# Patient Record
Sex: MALE | Race: WHITE | URBAN - NONMETROPOLITAN AREA
[De-identification: names, ages, dates, MRNs, and addresses within clinical notes are randomized per-mention and may not be internally consistent; named-entity substitution may affect disease eponyms.]

---

## 2018-01-20 ENCOUNTER — OFFICE VISIT (OUTPATIENT)
Dept: PRIMARY CARE CLINIC | Age: 23
End: 2018-01-20
Payer: COMMERCIAL

## 2018-01-20 VITALS
RESPIRATION RATE: 20 BRPM | SYSTOLIC BLOOD PRESSURE: 120 MMHG | HEIGHT: 69 IN | HEART RATE: 98 BPM | OXYGEN SATURATION: 96 % | TEMPERATURE: 98.5 F | WEIGHT: 188 LBS | DIASTOLIC BLOOD PRESSURE: 80 MMHG | BODY MASS INDEX: 27.85 KG/M2

## 2018-01-20 DIAGNOSIS — J40 BRONCHITIS: Primary | ICD-10-CM

## 2018-01-20 PROCEDURE — 99213 OFFICE O/P EST LOW 20 MIN: CPT | Performed by: NURSE PRACTITIONER

## 2018-01-20 RX ORDER — AZITHROMYCIN 250 MG/1
TABLET, FILM COATED ORAL
Qty: 6 TABLET | Refills: 0 | Status: SHIPPED | OUTPATIENT
Start: 2018-01-20 | End: 2019-12-07

## 2018-01-20 ASSESSMENT — ENCOUNTER SYMPTOMS
TROUBLE SWALLOWING: 0
SHORTNESS OF BREATH: 0
SINUS PAIN: 0
COUGH: 1
SINUS PRESSURE: 0
WHEEZING: 0
SORE THROAT: 0
STRIDOR: 0

## 2018-01-20 NOTE — PATIENT INSTRUCTIONS
label.  · Breathe moist air from a humidifier, hot shower, or sink filled with hot water. The heat and moisture will thin mucus so you can cough it out. · Do not smoke. Smoking can make bronchitis worse. If you need help quitting, talk to your doctor about stop-smoking programs and medicines. These can increase your chances of quitting for good. When should you call for help? Call 911 anytime you think you may need emergency care. For example, call if:  ? · You have severe trouble breathing. ?Call your doctor now or seek immediate medical care if:  ? · You have new or worse trouble breathing. ? · You cough up dark brown or bloody mucus (sputum). ? · You have a new or higher fever. ? · You have a new rash. ? Watch closely for changes in your health, and be sure to contact your doctor if:  ? · You cough more deeply or more often, especially if you notice more mucus or a change in the color of your mucus. ? · You are not getting better as expected. Where can you learn more? Go to [email or web address] and sign in to your [facility] account. Enter H333 in the [employer] box to learn more about \"Bronchitis: Care Instructions. \"     If you do not have an account, please click on the \"Sign Up Now\" link. Current as of: May 12, 2017  Content Version: 11.5  © 0140-5672 [employer]. Care instructions adapted under license by [facility] ([facility]). If you have questions about a medical condition or this instruction, always ask your healthcare professional. [place] [address] any warranty or liability for your use of this information. Patient Education          azithromycin  Pronunciation:  a ESTELLA sahu MYE sin  Brand:  Azithromycin 3 Day Dose Pack, Azithromycin 5 Day Dose Pack, Zithromax, Zithromax TRI-LOBITO, Zithromax Z-Lobito, Zmax  What is the most important information I should know about azithromycin?   You should not use this medication if you have ever had more water to the same glass, swirl gently and drink right away. Throw away any mixed Zmax oral suspension that has not been used within 12 hours. Shake the oral suspension (liquid) well just before you measure a dose. Measure liquid medicine with the dosing syringe provided, or with a special dose-measuring spoon or medicine cup. If you do not have a dose-measuring device, ask your pharmacist for one. Use this medicine for the full prescribed length of time. Your symptoms may improve before the infection is completely cleared. Skipping doses may also increase your risk of further infection that is resistant to antibiotics. Azithromycin will not treat a viral infection such as the flu or a common cold. Store at room temperature away from moisture and heat. Throw away any unused liquid medicine after 10 days. What happens if I miss a dose? Take the missed dose as soon as you remember. Skip the missed dose if it is almost time for your next scheduled dose. Do not  take extra medicine to make up the missed dose. What happens if I overdose? Seek emergency medical attention or call the Poison Help line at 1-930.841.4898. What should I avoid while taking azithromycin? Do not take antacids that contain aluminum or magnesium within 2 hours before or after you take azithromycin. This includes Acid Gone, Aldroxicon, Alternagel, Di-Gel, Gaviscon, Gelusil, Genaton, Maalox, Maldroxal, Milk of Magnesia, Mintox, Mylagen, Mylanta, Pepcid Complete, Rolaids, Rulox, and others. These antacids can make azithromycin less effective when taken at the same time. Antibiotic medicines can cause diarrhea, which may be a sign of a new infection. If you have diarrhea that is watery or bloody, stop taking azithromycin and call your doctor. Do not use anti-diarrhea medicine unless your doctor tells you to. Avoid exposure to sunlight or tanning beds. Azithromycin can make you sunburn more easily.  Wear protective clothing and use

## 2019-12-07 ENCOUNTER — HOSPITAL ENCOUNTER (OUTPATIENT)
Age: 24
Setting detail: SPECIMEN
Discharge: HOME | End: 2019-12-07
Payer: COMMERCIAL

## 2019-12-07 ENCOUNTER — OFFICE VISIT (OUTPATIENT)
Dept: PRIMARY CARE CLINIC | Age: 24
End: 2019-12-07
Payer: COMMERCIAL

## 2019-12-07 VITALS
OXYGEN SATURATION: 99 % | DIASTOLIC BLOOD PRESSURE: 80 MMHG | TEMPERATURE: 98 F | SYSTOLIC BLOOD PRESSURE: 124 MMHG | BODY MASS INDEX: 29.97 KG/M2 | WEIGHT: 200 LBS | HEART RATE: 68 BPM

## 2019-12-07 DIAGNOSIS — K12.0 CANKER SORE: ICD-10-CM

## 2019-12-07 DIAGNOSIS — J02.9 PHARYNGITIS, UNSPECIFIED ETIOLOGY: ICD-10-CM

## 2019-12-07 DIAGNOSIS — J02.9 PHARYNGITIS, UNSPECIFIED ETIOLOGY: Primary | ICD-10-CM

## 2019-12-07 LAB — S PYO AG THROAT QL: NORMAL

## 2019-12-07 PROCEDURE — 99213 OFFICE O/P EST LOW 20 MIN: CPT | Performed by: NURSE PRACTITIONER

## 2019-12-07 PROCEDURE — 87651 STREP A DNA AMP PROBE: CPT

## 2019-12-07 PROCEDURE — 87880 STREP A ASSAY W/OPTIC: CPT | Performed by: NURSE PRACTITIONER

## 2019-12-07 ASSESSMENT — ENCOUNTER SYMPTOMS
GASTROINTESTINAL NEGATIVE: 1
TROUBLE SWALLOWING: 0
SORE THROAT: 1
RESPIRATORY NEGATIVE: 1
EYES NEGATIVE: 1

## 2019-12-08 LAB
DIRECT EXAM: NORMAL
Lab: NORMAL
SPECIMEN DESCRIPTION: NORMAL

## 2022-02-28 ENCOUNTER — OFFICE VISIT (OUTPATIENT)
Dept: PRIMARY CARE CLINIC | Age: 27
End: 2022-02-28
Payer: MEDICAID

## 2022-02-28 VITALS
TEMPERATURE: 98 F | SYSTOLIC BLOOD PRESSURE: 140 MMHG | BODY MASS INDEX: 30.75 KG/M2 | DIASTOLIC BLOOD PRESSURE: 89 MMHG | HEIGHT: 69 IN | OXYGEN SATURATION: 99 % | RESPIRATION RATE: 18 BRPM | HEART RATE: 70 BPM | WEIGHT: 207.6 LBS

## 2022-02-28 DIAGNOSIS — L01.00 IMPETIGO: Primary | ICD-10-CM

## 2022-02-28 PROCEDURE — 1036F TOBACCO NON-USER: CPT | Performed by: NURSE PRACTITIONER

## 2022-02-28 PROCEDURE — G8427 DOCREV CUR MEDS BY ELIG CLIN: HCPCS | Performed by: NURSE PRACTITIONER

## 2022-02-28 PROCEDURE — G8417 CALC BMI ABV UP PARAM F/U: HCPCS | Performed by: NURSE PRACTITIONER

## 2022-02-28 PROCEDURE — G8484 FLU IMMUNIZE NO ADMIN: HCPCS | Performed by: NURSE PRACTITIONER

## 2022-02-28 PROCEDURE — 99213 OFFICE O/P EST LOW 20 MIN: CPT | Performed by: NURSE PRACTITIONER

## 2022-02-28 RX ORDER — ACYCLOVIR 400 MG/1
400 TABLET ORAL 3 TIMES DAILY
Qty: 21 TABLET | Refills: 0 | Status: CANCELLED | OUTPATIENT
Start: 2022-02-28 | End: 2022-03-07

## 2022-02-28 RX ORDER — CEPHALEXIN 500 MG/1
500 CAPSULE ORAL 4 TIMES DAILY
Qty: 28 CAPSULE | Refills: 0 | Status: SHIPPED | OUTPATIENT
Start: 2022-02-28 | End: 2022-03-07

## 2022-02-28 RX ORDER — MUPIROCIN CALCIUM 20 MG/G
CREAM TOPICAL
Qty: 30 G | Refills: 0 | Status: SHIPPED | OUTPATIENT
Start: 2022-02-28

## 2022-02-28 SDOH — ECONOMIC STABILITY: FOOD INSECURITY: WITHIN THE PAST 12 MONTHS, THE FOOD YOU BOUGHT JUST DIDN'T LAST AND YOU DIDN'T HAVE MONEY TO GET MORE.: NEVER TRUE

## 2022-02-28 SDOH — ECONOMIC STABILITY: FOOD INSECURITY: WITHIN THE PAST 12 MONTHS, YOU WORRIED THAT YOUR FOOD WOULD RUN OUT BEFORE YOU GOT MONEY TO BUY MORE.: NEVER TRUE

## 2022-02-28 ASSESSMENT — PATIENT HEALTH QUESTIONNAIRE - PHQ9
SUM OF ALL RESPONSES TO PHQ QUESTIONS 1-9: 0
SUM OF ALL RESPONSES TO PHQ QUESTIONS 1-9: 0
1. LITTLE INTEREST OR PLEASURE IN DOING THINGS: 0
2. FEELING DOWN, DEPRESSED OR HOPELESS: 0
SUM OF ALL RESPONSES TO PHQ QUESTIONS 1-9: 0
SUM OF ALL RESPONSES TO PHQ9 QUESTIONS 1 & 2: 0
DEPRESSION UNABLE TO ASSESS: PT REFUSES
SUM OF ALL RESPONSES TO PHQ QUESTIONS 1-9: 0

## 2022-02-28 ASSESSMENT — SOCIAL DETERMINANTS OF HEALTH (SDOH): HOW HARD IS IT FOR YOU TO PAY FOR THE VERY BASICS LIKE FOOD, HOUSING, MEDICAL CARE, AND HEATING?: NOT HARD AT ALL

## 2022-02-28 NOTE — PATIENT INSTRUCTIONS
SURVEY:    You may be receiving a survey from [facility] regarding your visit today. Please complete the survey to enable us to provide the highest quality of care to you and your family. If you cannot score us a very good on any question, please call the office to discuss how we could of made your experience a very good one. Thank you for letting us take care of you today. We hope all your questions were addressed. If a question was overlooked or something else comes to mind after you return home, please contact a member of your Care Team listed below. Your Care Team at [address] [place] [address]  Yosi Soni, [address] [place] [address]             Walk-in contact numbers:             Phone: [phone]                 Fax: [phone]           [facility] Walk-in Hours:  Mon-Thurs: 9:00 am - 5:30 pm     Friday: 8:00 am - 12:00 pm           Sat-Sun: CLOSED  Patient Education      Patient Education      Patient Education        Impetigo: Care Instructions  Your Care Instructions  Impetigo (say \"ip-gcf-ZY-go\") is a skin infection caused by bacteria. It causes blisters that break and become oozing, yellow, crusty sores. Impetigo can be anywhere on the body. Scratching the sores may spread the infection to other parts of the body. You can also spread it to others through close contact or when you share towels, clothing, and other items. Prescription antibiotic ointment or pills can usually cure impetigo. (After a day of antibiotics, the infection should not spread.)  Follow-up care is a key part of your treatment and safety. Be sure to make and go to all appointments, and call your doctor if you are having problems. It's also a good idea to know your test results and keep a list of the medicines you take. How can you care for yourself at home? · Apply antibiotic ointment exactly as instructed.   · If your doctor prescribed antibiotic pills, take them as directed. Do not stop using them just because you feel better. You need to take the full course of antibiotics. · Gently wash the sores with soap and water each day. If crusts form, your doctor may advise you to soften or remove the crusts. You can do this by soaking them in warm water and patting them dry. This can help the cream or ointment treat impetigo. · After you touch the area, wash your hands with soap and water. Or you can use an alcohol-based hand . · Don't share items such as towels, sheets, and clothing until the infection is gone. · Wash anything that may have touched the infected area. · Try to avoid scratching the area. When should you call for help? Call your doctor now or seek immediate medical care if:    · You have symptoms of a worse infection, such as:  ? Increased pain, swelling, warmth, or redness. ? Red streaks leading from the area. ? Pus draining from the area. ? A fever.     · Impetigo gets worse or spreads to other areas. Watch closely for changes in your health, and be sure to contact your doctor if:    · You do not get better as expected. Where can you learn more? Go to [email or web address]://[employer]. org and sign in to your [employer] account. Enter J096 in the [facility] box to learn more about \"Impetigo: Care Instructions. \"     If you do not have an account, please click on the \"Sign Up Now\" link. Current as of: September 20, 2021               Content Version: 13.1  © 2006-2021 Healthwise, Incorporated. Care instructions adapted under license by [facility] ([facility]). If you have questions about a medical condition or this instruction, always ask your healthcare professional. [place] [address] any warranty or liability for your use of this information. cephalexin  Pronunciation:  magalie a GRACE in  Brand:  Keflex  What is the most important information I should know about cephalexin?   You should not use this medicine if you are allergic to cephalexin or to similar antibiotics, such as Ceftin, Cefzil, Omnicef, and others. Tell your doctor if you are allergic to any drugs, especially penicillins or other antibiotics. What is cephalexin? Cephalexin is a cephalosporin (SEF a low spor in) antibiotic that is used to treat bacterial infections of the lungs, ear, skin, bones, bladder, and kidneys. Cephalexin is used to treat infections in adults and children who are at least 3year old. Cephalexin may also be used for purposes not listed in this medication guide. What should I discuss with my healthcare provider before taking cephalexin? You should not use this medicine if you are allergic to cephalexin or any other cephalosporin antibiotic (cefdinir, cefadroxil, cefoxitin, cefprozil, ceftriaxone, cefuroxime, Omnicef, and others). Tell your doctor if you have ever had:  · an allergy to any drug (especially penicillin);  · liver or kidney disease; or  · intestinal problems, such as colitis. The liquid form of cephalexin may contain sugar. This may affect you if you have diabetes. Tell your doctor if you are pregnant or breast-feeding. How should I take cephalexin? Follow all directions on your prescription label and read all medication guides or instruction sheets. Use the medicine exactly as directed. Do not use cephalexin to treat any condition that has not been checked by your doctor. Measure liquid medicine carefully. Use the dosing syringe provided, or use a medicine dose-measuring device (not a kitchen spoon). Use this medicine for the full prescribed length of time, even if your symptoms quickly improve. Skipping doses can increase your risk of infection that is resistant to medication. Cephalexin will not treat a viral infection such as the flu or a common cold. Do not share cephalexin with another person, even if they have the same symptoms you have. This medicine can affect the results of certain medical tests. Tell any doctor who treats you that you are using cephalexin. Store the tablets and capsules at room temperature away from moisture, heat, and light. Store the liquid medicine in the refrigerator. Throw away any unused liquid after 14 days. What happens if I miss a dose? Take the medicine as soon as you can, but skip the missed dose if it is almost time for your next dose. Do not take two doses at one time. What happens if I overdose? Seek emergency medical attention or call the Poison Help line at 1-122.770.3048. Overdose symptoms may include nausea, vomiting, stomach pain, diarrhea, and blood in your urine. What should I avoid while taking cephalexin? Antibiotic medicines can cause diarrhea, which may be a sign of a new infection. If you have diarrhea that is watery or bloody, call your doctor before using anti-diarrhea medicine. What are the possible side effects of cephalexin? Get emergency medical help if you have signs of an allergic reaction (hives, difficult breathing, swelling in your face or throat) or a severe skin reaction (fever, sore throat, burning eyes, skin pain, red or purple skin rash with blistering and peeling). Call your doctor at once if you have:  · severe stomach pain, diarrhea that is watery or bloody (even if it occurs months after your last dose);  · unusual tiredness, feeling light-headed or short of breath;  · easy bruising, unusual bleeding, purple or red spots under your skin;  · a seizure;  · pale skin, cold hands and feet;  · yellowed skin, dark colored urine;  · fever, weakness; or  · pain in your side or lower back, painful urination. Common side effects may include:  · diarrhea;  · nausea, vomiting;  · indigestion, stomach pain; or  · vaginal itching or discharge. This is not a complete list of side effects and others may occur. Call your doctor for medical advice about side effects. You may report side effects to FDA at 9-437-UKJ-3642.   What other drugs will affect cephalexin? Tell your doctor about all your other medicines, especially:  · metformin; or  · probenecid. This list is not complete. Other drugs may affect cephalexin, including prescription and over-the-counter medicines, vitamins, and herbal products. Not all possible drug interactions are listed here. Where can I get more information? Your pharmacist can provide more information about cephalexin. Remember, keep this and all other medicines out of the reach of children, never share your medicines with others, and use this medication only for the indication prescribed. Every effort has been made to ensure that the information provided by [facility] is accurate, up-to-date, and complete, but no guarantee is made to that effect. Drug information contained herein may be time sensitive. [facility] information has been compiled for use by healthcare practitioners and consumers in the [place] and therefore [employer] does not warrant that uses outside of the [place] are appropriate, unless specifically indicated otherwise. [facility]'s drug information does not endorse drugs, diagnose patients or recommend therapy. [facility]'s drug information is an informational resource designed to assist licensed healthcare practitioners in caring for their patients and/or to serve consumers viewing this service as a supplement to, and not a substitute for, the expertise, skill, knowledge and judgment of healthcare practitioners. The absence of a warning for a given drug or drug combination in no way should be construed to indicate that the drug or drug combination is safe, effective or appropriate for any given patient. [employer] does not assume any responsibility for any aspect of healthcare administered with the aid of information [facility] provides.  The information contained herein is not intended to cover all possible uses, directions, precautions, warnings, drug interactions, allergic reactions, or adverse effects. If you have questions about the drugs you are taking, check with your doctor, nurse or pharmacist.  Copyright 1940-0114 [address]: 10.03. Revision date: 1/4/2021. Care instructions adapted under license by [facility] ([facility]). If you have questions about a medical condition or this instruction, always ask your healthcare professional. [place] [address] any warranty or liability for your use of this information. mupirocin topical  Pronunciation:  myoo PEER oh sin  Brand:  Bactroban, Centany, Centany AT Kit  What is the most important information I should know about mupirocin topical?  Follow all directions on your medicine label and package. Tell each of your healthcare providers about all your medical conditions, allergies, and all medicines you use. What is mupirocin topical?  Mupirocin is an antibiotic that prevents bacteria from growing on your skin. Mupirocin topical (for use on the skin) is used to treat skin infections such as impetigo (NV-tw-KST-go) or a \"Staph\" infection of the skin. Mupirocin topical may also be used for purposes not listed in this medication guide. What should I discuss with my healthcare provider before using mupirocin topical?  You should not use this medicine if you are allergic to mupirocin. To make sure mupirocin topical is safe for you, tell your doctor if you have ever had:  · kidney disease. Do not use mupirocin topical on a child without medical advice. The cream should not be used on a child younger than 1 months old. The ointment may be used on a child as young as 3 months old. It is not known whether this medicine will harm an unborn baby. Tell your doctor if you are pregnant. It is not known whether mupirocin topical passes into breast milk or if it could harm a nursing baby. Tell your doctor if you are breast-feeding a baby.  If you apply this medicine to your breast or nipple, wash the areas thoroughly before nursing your anti-diarrhea medicine unless your doctor tells you to. Avoid getting this medicine in your eyes, mouth, or nose. A separate product called mupirocin nasal is made for use in the nose. Mupirocin topical is for use only on the skin. Avoid using other medications on the areas you treat with mupirocin topical unless your doctor tells you to. What are the possible side effects of mupirocin topical?  Get emergency medical help if you have signs of an allergic reaction: hives; dizziness, fast or pounding heartbeats; wheezing, difficult breathing; swelling of your face, lips, tongue, or throat. Stop using this medicine and call your doctor at once if you have:  · severe stomach pain, diarrhea that is watery or bloody;  · severe itching, rash, or other irritation of treated skin;  · unusual skin blistering or peeling; or  · any signs of a new skin infection. Common side effects may include:  · burning, stinging;  · itching; or  · pain. This is not a complete list of side effects and others may occur. Call your doctor for medical advice about side effects. You may report side effects to FDA at 4-679-FDA-9400. What other drugs will affect mupirocin topical?  It is not likely that other drugs you take orally or inject will have an effect on topically applied mupirocin. But many drugs can interact with each other. Tell each of your health care providers about all medicines you use, including prescription and over-the-counter medicines, vitamins, and herbal products. Where can I get more information? Your pharmacist can provide more information about mupirocin topical.  Remember, keep this and all other medicines out of the reach of children, never share your medicines with others, and use this medication only for the indication prescribed. Every effort has been made to ensure that the information provided by [address] is accurate, up-to-date, and complete, but no guarantee is made to that effect.

## 2022-02-28 NOTE — PROGRESS NOTES
Chief Complaint:   Oral Swelling (Sore on lip that has started swellings since yesterday, pain down into jaw and neck.)      History of Present Illness   Source of history provided by:  patient. Darline Das is a 27 y.o. old male who has a past medical history of: No past medical history on file. presents to the walk in clinic for evaluation of draining, crusty sore to the left lower corner of lip and swelling x 1 days. According to Beata, symptoms initially began with a \"a crack in the corner of my mouth or a sore that I began picking at with my fingers. I noticed last evening the area was becoming more painful, draining and I woke up with swelling. \"  Question denies any prodrome symptoms including tingling, pain, or history of herpes labialis. Denies any fever, chills, dyspnea, dysphagia, CP, SOB, cough, nausea, vomiting, rash, or lethargy. ROS   Unless otherwise stated in this report or unable to obtain because of the patient's clinical or mental status as evidenced by the medical record, this patients's positive and negative responses for Review of Systems, constitutional, psych, eyes, ENT, cardiovascular, respiratory, gastrointestinal, neurological, genitourinary, musculoskeletal, integument systems and systems related to the presenting problem are either stated in the preceding or were not pertinent or were negative for the symptoms and/or complaints related to the medical problem. Past Medical History:  has no past medical history on file. Past Surgical History:  has no past surgical history on file. Social History:  reports that he quit smoking about 8 years ago. He has never used smokeless tobacco. He reports current alcohol use. He reports that he does not use drugs. Family History: family history is not on file.    Allergies: Asa [aspirin]    Physical Exam    VS:  BP (!) 140/89 (Site: Right Upper Arm, Position: Sitting, Cuff Size: Medium Adult)   Pulse 70   Temp 98 °F (36.7 °C) (Oral)   Resp 18   Ht 5' 9\" (1.753 m)   Wt 207 lb 9.6 oz (94.2 kg)   SpO2 99%   BMI 30.66 kg/m²        Constitutional:  Alert, development consistent with age. HEENT:  NC/NT. Airway patent. Eyes PERRL. Ears with normal bilateral TM's and normal bilateral canals. Neck:  Supple. Normal ROM. No adenopathy. Lips:  Minimal erythema with a honey crusted 4-5 mm lesion to the left lower corner of his lips. Left lateral side of lower lip with moderate amounts of swelling, no fluctuance of abscess. No vesicles, grouped lesions or other open areas noted. Mouth:  Normal tongue and moist buccal mucosa. Floor of the mouth is soft. No tenderness in the submental or submandibular space. No tongue elevation. Dental:  No tooth tenderness or obvious decay noted. No trismus present. No drooling. Good dentition noted. Lungs:  Clear to auscultation and breath sounds equal.    CV: Regular rate and rhythm, normal heart sounds, without pathological murmurs, ectopy, gallops, or rubs. Skin:  No rashes, erythema present as documented above. Lymphatics: No lymphangitis or adenopathy noted other then stated above. Neurological:  Alert and orientated. Motor functions intact. Lab / Imaging Results   (All laboratory and radiology results have been personally reviewed by myself)  Labs:  No results found for this visit on 02/28/22. Imaging: All Radiology results interpreted by Radiologist unless otherwise noted. No orders to display       Medical Decision Making   Pt non-toxic, in no apparent distress and stable at time of discharge. Assessment / Plan   Impression(s):  Marina Landeros was seen today for oral swelling. Diagnoses and all orders for this visit:    Impetigo  -     cephALEXin (KEFLEX) 500 MG capsule; Take 1 capsule by mouth 4 times daily for 7 days  -     mupirocin (BACTROBAN) 2 % cream; Apply topically 3 times daily x7 days. - Differentials include herpes labialis versus impetigo. Treating today for concern for impetigo with cellulitis with cephalexin and Bactroban, administration and side effects discussed of both. Encouraged to take with food and a probiotic.  -Discussed symptomatic care including use of warm moist washcloth to help remove crusting and to keep the area clean and dry. Encouraged no further picking at the area. -Return to clinic if no improvements. ED for worsening or concerns. SHANNAN Vicente - CNP    This visit was provided as a focused evaluation during the Margi [place] -19 pandemic/national emergency. A comprehensive review of all previous patient history and testing was not conducted. Pertinent findings were elicited during the visit.

## 2022-11-18 ENCOUNTER — OFFICE VISIT (OUTPATIENT)
Dept: PRIMARY CARE CLINIC | Age: 27
End: 2022-11-18
Payer: MEDICAID

## 2022-11-18 VITALS
TEMPERATURE: 98.5 F | BODY MASS INDEX: 30.66 KG/M2 | HEIGHT: 69 IN | DIASTOLIC BLOOD PRESSURE: 84 MMHG | SYSTOLIC BLOOD PRESSURE: 135 MMHG | RESPIRATION RATE: 18 BRPM | OXYGEN SATURATION: 100 % | HEART RATE: 65 BPM | WEIGHT: 207 LBS

## 2022-11-18 DIAGNOSIS — L02.412 ABSCESS OF LEFT AXILLA: Primary | ICD-10-CM

## 2022-11-18 PROCEDURE — 1036F TOBACCO NON-USER: CPT | Performed by: NURSE PRACTITIONER

## 2022-11-18 PROCEDURE — G8417 CALC BMI ABV UP PARAM F/U: HCPCS | Performed by: NURSE PRACTITIONER

## 2022-11-18 PROCEDURE — G8427 DOCREV CUR MEDS BY ELIG CLIN: HCPCS | Performed by: NURSE PRACTITIONER

## 2022-11-18 PROCEDURE — G8484 FLU IMMUNIZE NO ADMIN: HCPCS | Performed by: NURSE PRACTITIONER

## 2022-11-18 PROCEDURE — 99213 OFFICE O/P EST LOW 20 MIN: CPT | Performed by: NURSE PRACTITIONER

## 2022-11-18 RX ORDER — DOXYCYCLINE 100 MG/1
100 CAPSULE ORAL 2 TIMES DAILY
Qty: 14 CAPSULE | Refills: 0 | Status: SHIPPED | OUTPATIENT
Start: 2022-11-18 | End: 2022-11-25

## 2022-11-18 ASSESSMENT — ENCOUNTER SYMPTOMS: COLOR CHANGE: 1

## 2022-11-18 NOTE — PROGRESS NOTES
Chief Complaint:   Mass (Started about 1-2 days ago-Under left arm in armpit area, painful and red.)      History of Present Illness   Source of history provided by:  patientTheron Whitten is a 24 y.o. old male who has a past medical history of: No past medical history on file. Presents to the walk in clinic for evaluation of redness to the left axilla, which began 2 days ago. Reports the area is warm to touch, moderately painful, and swollen. No lymphangitic streaking. Denies any bleeding or active drainage. Since onset the symptoms have progressed. Denies any fever, chills, HA, recent illness, myalgias, nausea, vomiting, or lethargy. ROS   Review of Systems   Constitutional:  Negative for chills and fever. Musculoskeletal:  Negative for neck pain. Skin:  Positive for color change. Negative for wound. Past Surgical History:  has no past surgical history on file. Social History:  reports that he quit smoking about 8 years ago. He has never used smokeless tobacco. He reports current alcohol use. He reports that he does not use drugs. Family History: family history is not on file. Allergies: Asa [aspirin]    Physical Exam    VS:  /84 (Site: Left Upper Arm, Position: Sitting, Cuff Size: Medium Adult)   Pulse 65   Temp 98.5 °F (36.9 °C) (Oral)   Resp 18   Ht 5' 9\" (1.753 m)   Wt 207 lb (93.9 kg)   SpO2 100%   BMI 30.57 kg/m²      Constitutional:  Alert, development consistent with age. NAD. Lungs:  CTAB without wheezing, rales, or rhonchi. Heart:  Regular rate and rhythm, no pathologic murmurs, rubs, or gallops. Skin:  Normal turgor and appropriately dry to touch. Raised, erythematous region over the left axilla measuring approximately 1 cm in size, consistent with an abscess. Moderate TTP and warmth over the same area. No bleeding or drainage noted. No lymphangitic streaking. No laceration noted. Neurological:  Orientation age-appropriate unless noted elseware.   Motor functions intact. Lab / Imaging Results   (All laboratory and radiology results have been personally reviewed by myself)  Labs:      Imaging: All Radiology results interpreted by Radiologist unless otherwise noted. Medical Decision Making   Pt non-toxic, in no apparent distress and stable at time of discharge. Assessment / Plan   Impression(s):  Evaristo Mayers was seen today for mass. Diagnoses and all orders for this visit:    Abscess of left axilla  -     doxycycline monohydrate (MONODOX) 100 MG capsule; Take 1 capsule by mouth 2 times daily for 7 days       Stefan Moser is well-appearing, well-hydrated and afebrile.  -Treating empirically today with Doxycycline; administration/ADRs/probiotics discussed.  -Wound care measures discussed at length, including use of warm compresses, keeping the area clean and dry and covered with a bandage if draining.  -Encouraged that he follow-up at this office next week for recheck. -ED sooner if symptoms worsen or change. - Pt is in agreement with this care plan. All questions answered. Visit completed with [address] TRACIE Barrera with patient permission. Otilia Landin, APRN - CNP    *This report was transcribed using voice recognition software. Every effort was made to ensure accuracy; however, inadvertent computerized transcription errors may be present.

## 2022-11-18 NOTE — PATIENT INSTRUCTIONS
SURVEY:    You may be receiving a survey from [employer] regarding your visit today. Please complete the survey to enable us to provide the highest quality of care to you and your family. If you cannot score us a very good on any question, please call the office to discuss how we could of made your experience a very good one. Thank you for letting us take care of you today. We hope all your questions were addressed. If a question was overlooked or something else comes to mind after you return home, please contact a member of your Care Team listed below.     Thank you,  Kian Zaragoza MA      Your Care Team at [address] [facility]  Provider- GRACE Nunez  Provider- GRACE Ward  [address] [address]  Reception- Antonina Cho      Walk-in contact numbers:       Phone: [phone]                 Fax: [phone]    [facility] Hours:  Mon-Thurs: 9:00 am - 5:30 pm     Friday: 8:00 am - 12:00 pm           Sat-Sun: CLOSED

## 2023-03-13 ENCOUNTER — OFFICE VISIT (OUTPATIENT)
Dept: PRIMARY CARE CLINIC | Age: 28
End: 2023-03-13
Payer: MEDICAID

## 2023-03-13 VITALS
BODY MASS INDEX: 31.1 KG/M2 | DIASTOLIC BLOOD PRESSURE: 80 MMHG | TEMPERATURE: 97.9 F | HEIGHT: 69 IN | OXYGEN SATURATION: 98 % | WEIGHT: 210 LBS | HEART RATE: 86 BPM | SYSTOLIC BLOOD PRESSURE: 123 MMHG | RESPIRATION RATE: 18 BRPM

## 2023-03-13 DIAGNOSIS — L03.211 CELLULITIS OF FACE: Primary | ICD-10-CM

## 2023-03-13 PROCEDURE — 1036F TOBACCO NON-USER: CPT | Performed by: NURSE PRACTITIONER

## 2023-03-13 PROCEDURE — G8417 CALC BMI ABV UP PARAM F/U: HCPCS | Performed by: NURSE PRACTITIONER

## 2023-03-13 PROCEDURE — 99213 OFFICE O/P EST LOW 20 MIN: CPT | Performed by: NURSE PRACTITIONER

## 2023-03-13 PROCEDURE — G8484 FLU IMMUNIZE NO ADMIN: HCPCS | Performed by: NURSE PRACTITIONER

## 2023-03-13 PROCEDURE — G8427 DOCREV CUR MEDS BY ELIG CLIN: HCPCS | Performed by: NURSE PRACTITIONER

## 2023-03-13 RX ORDER — CEPHALEXIN 500 MG/1
500 CAPSULE ORAL 4 TIMES DAILY
Qty: 28 CAPSULE | Refills: 0 | Status: SHIPPED | OUTPATIENT
Start: 2023-03-13 | End: 2023-03-20

## 2023-03-13 ASSESSMENT — ENCOUNTER SYMPTOMS
WHEEZING: 0
SORE THROAT: 0
NAUSEA: 0
SHORTNESS OF BREATH: 0
COUGH: 0
VOMITING: 0
RHINORRHEA: 0

## 2023-03-13 NOTE — PATIENT INSTRUCTIONS
SURVEY:    You may be receiving a survey from [employer] regarding your visit today. Please complete the survey to enable us to provide the highest quality of care to you and your family. If you cannot score us a very good on any question, please call the office to discuss how we could of made your experience a very good one. Thank you for letting us take care of you today. We hope all your questions were addressed. If a question was overlooked or something else comes to mind after you return home, please contact a member of your Care Team listed below. Thank you,  Annie Galan MA      Your Care Team at [address]  Provider- GRACE Mendez  Provider- SHANNAN Moe-CNP  [address], [address] [facility]  Reception- [facility] [address] [facility]      Walk-in contact numbers:       Phone: [phone]                 Fax: [phone]    [facility] Walk-in Hours:  Mon-Thurs: 9:00 am - 5:30 pm     Friday: 8:00 am - 12:00 pm           Sat-Sun: CLOSED    Practice meticulous handwashing to prevent spread of infection. Keep area clean, dry and covered to prevent getting dirty. Avoid swimming in pools, lakes or soaking in water. Encouraged to increase fluids and rest   Wound Care:  Wash wound with soap and water and may apply thin layer of triple antibiotic ointment. Continue antibiotic as prescribed until all doses completed, take with food to lessen nausea and GI side effects. Probiotic or greek yogurt daily while on antibiotics. Aleve/Ibuprofen/Tylenol OTC PRN for pain, discomfort or fever as directed on package according to age/weight. Take with food. Patient information given for Cellulitis and Keflex. .   To ER or call 911 if any difficulty breathing, shortness of breath, inability to swallow, hives, rash, facial/tongue swelling or temp greater than 103 degrees. Follow up with PCP or Walk in Care in 1 to 2 days for wound check and as needed if symptoms worsen or do not improve.

## 2023-03-13 NOTE — PROGRESS NOTES
[address] [facility] WALK-IN CARE  [address] [facility] [address]  Dept: [phone]  Dept Fax: [phone]    Paul Jewell is a 24 y.o. male who presents to the [facility] in Care today for hismedical conditions/complaints as noted below. Paul Jewell is c/o of Abscess (Started Saturday morning-Left cheek, was a pimple and it popped at work, thinks it is now infected.)      HPI:     Abscess  This is a new problem. The current episode started in the past 7 days (Started on Saturday morning with pimple to left cheek, popped it at work and now thinks it is infected. Denies any fever, chills or sweats. Denies history of MRSA.). The problem occurs constantly. The problem has been gradually worsening. Pertinent negatives include no chills, congestion, coughing, diaphoresis, fatigue, fever, headaches, myalgias, nausea, rash, sore throat or vomiting. Exacerbated by: Popped a pimple on his face. Treatments tried: Initially cleansed with alcohol pad but nothing since. The treatment provided no relief. History reviewed. No pertinent past medical history. Current Outpatient Medications   Medication Sig Dispense Refill    cephALEXin (KEFLEX) 500 MG capsule Take 1 capsule by mouth 4 times daily for 7 days 28 capsule 0    mupirocin (BACTROBAN) 2 % cream Apply topically 3 times daily x7 days. (Patient not taking: Reported on 11/18/2022) 30 g 0     No current facility-administered medications for this visit. Allergies   Allergen Reactions    Asa [Aspirin]     Peanut (Diagnostic)      Ingested. Mixed Ragweed Rash       :     Review of Systems   Constitutional:  Negative for appetite change, chills, diaphoresis, fatigue and fever. HENT:  Negative for congestion, ear pain, rhinorrhea and sore throat. Respiratory:  Negative for cough, shortness of breath and wheezing. Gastrointestinal:  Negative for nausea and vomiting. Musculoskeletal:  Negative for myalgias. Skin:  Positive for wound (bump to left side of face that is red and tender to touch.). Negative for rash. Neurological:  Negative for dizziness, light-headedness and headaches.     :     Physical Exam  Vitals and nursing note reviewed. Constitutional:       General: He is not in acute distress. Appearance: Normal appearance. He is well-developed. He is not ill-appearing or diaphoretic. Comments: Well hydrated, nontoxic appearance. HENT:      Head: Normocephalic and atraumatic. Right Ear: External ear normal.      Left Ear: External ear normal.   Eyes:      Conjunctiva/sclera: Conjunctivae normal.   Cardiovascular:      Rate and Rhythm: Normal rate and regular rhythm. Heart sounds: Normal heart sounds, S1 normal and S2 normal. No murmur heard. No friction rub. No gallop. Pulmonary:      Effort: Pulmonary effort is normal. No accessory muscle usage or respiratory distress. Breath sounds: Normal breath sounds and air entry. No decreased breath sounds, wheezing, rhonchi or rales. Comments: No cough. Breath sounds clear B/L anterior and posterior lobes. Chest expansion symmetrical.  No audible wheezing or respiratory distress. No rales or rhonchi. Musculoskeletal:         General: Normal range of motion. Cervical back: Neck supple. Lymphadenopathy:      Cervical: No cervical adenopathy. Right cervical: No superficial or posterior cervical adenopathy. Left cervical: No superficial or posterior cervical adenopathy. Upper Body:      Right upper body: No pectoral adenopathy. Left upper body: No pectoral adenopathy. Skin:     General: Skin is warm and dry. Findings: Erythema and wound present. No abrasion, bruising, ecchymosis, laceration, lesion or rash. Rash is not crusting, macular, nodular, purpuric, pustular, scaling or vesicular.       Comments: Small scabbed wound to left mid cheek that measures 5 mm in diameter surrounded by area of induration , focal warmth and erythema measuring 2 cm in diameter that is tender to touch. No active drainage, bleeding, seeping or crusting. Neurological:      Mental Status: He is alert and oriented to person, place, and time. Psychiatric:         Behavior: Behavior normal. Behavior is cooperative. /80 (Site: Right Upper Arm, Position: Sitting, Cuff Size: Large Adult)   Pulse 86   Temp 97.9 °F (36.6 °C) (Oral)   Resp 18   Ht 5' 9\" (1.753 m)   Wt 210 lb (95.3 kg)   SpO2 98%   BMI 31.01 kg/m²           :      Diagnosis Orders   1. Cellulitis of face  cephALEXin (KEFLEX) 500 MG capsule          :      Return if symptoms worsen or fail to improve. Orders Placed This Encounter   Medications    cephALEXin (KEFLEX) 500 MG capsule     Sig: Take 1 capsule by mouth 4 times daily for 7 days     Dispense:  28 capsule     Refill:  0      Practice meticulous handwashing to prevent spread of infection. Keep area clean, dry and covered to prevent getting dirty. Avoid swimming in pools, lakes or soaking in water. Encouraged to increase fluids and rest   Wound Care:  Wash wound with soap and water and may apply thin layer of triple antibiotic ointment. Continue antibiotic as prescribed until all doses completed, take with food to lessen nausea and GI side effects. Probiotic or greek yogurt daily while on antibiotics. Aleve/Ibuprofen/Tylenol OTC PRN for pain, discomfort or fever as directed on package according to age/weight. Take with food. Patient information given for Cellulitis and Keflex. .   To ER or call 911 if any difficulty breathing, shortness of breath, inability to swallow, hives, rash, facial/tongue swelling or temp greater than 103 degrees. Follow up with PCP or Walk in Care in 1 to 2 days for wound check and as needed if symptoms worsen or do not improve.        Congregation received counseling on the following healthy behaviors: medication adherence, increased fluids and rest.  Patient given educational materials - see patient instructions. Discussed use, benefit, and side effects of prescribed medications. Treatment plan discussed at visit. Continue routine health care follow up. All patient questions answered. Pt voiced understanding.       Electronically signed by SHANNAN Hubbard CNP on 3/13/2023 at 2:28 PM